# Patient Record
Sex: FEMALE | Race: WHITE | NOT HISPANIC OR LATINO | Employment: FULL TIME | URBAN - METROPOLITAN AREA
[De-identification: names, ages, dates, MRNs, and addresses within clinical notes are randomized per-mention and may not be internally consistent; named-entity substitution may affect disease eponyms.]

---

## 2020-11-15 ENCOUNTER — APPOINTMENT (OUTPATIENT)
Dept: GENERAL RADIOLOGY | Facility: HOSPITAL | Age: 40
End: 2020-11-15

## 2020-11-15 ENCOUNTER — HOSPITAL ENCOUNTER (EMERGENCY)
Facility: HOSPITAL | Age: 40
Discharge: HOME | End: 2020-11-15
Attending: EMERGENCY MEDICINE | Admitting: EMERGENCY MEDICINE

## 2020-11-15 VITALS
DIASTOLIC BLOOD PRESSURE: 90 MMHG | RESPIRATION RATE: 16 BRPM | WEIGHT: 206 LBS | BODY MASS INDEX: 31.22 KG/M2 | SYSTOLIC BLOOD PRESSURE: 160 MMHG | OXYGEN SATURATION: 98 % | HEART RATE: 90 BPM | HEIGHT: 68 IN | TEMPERATURE: 97.8 F

## 2020-11-15 DIAGNOSIS — S52.044A CLOSED NONDISPLACED FRACTURE OF CORONOID PROCESS OF RIGHT ULNA, INITIAL ENCOUNTER: Primary | ICD-10-CM

## 2020-11-15 PROCEDURE — 99282 EMERGENCY DEPT VISIT SF MDM: CPT

## 2020-11-15 PROCEDURE — 73070 X-RAY EXAM OF ELBOW: CPT

## 2020-11-15 PROCEDURE — 73090 X-RAY EXAM OF FOREARM: CPT

## 2020-11-15 RX ORDER — HYDROCODONE BITARTRATE AND ACETAMINOPHEN 5; 325 MG/1; MG/1
1 TABLET ORAL EVERY 6 HOURS PRN
Qty: 12 TABLET | Refills: 0 | Status: SHIPPED | OUTPATIENT
Start: 2020-11-15

## 2020-11-15 RX ORDER — IBUPROFEN 600 MG/1
600 TABLET ORAL EVERY 6 HOURS PRN
Qty: 30 TABLET | Refills: 0 | Status: SHIPPED | OUTPATIENT
Start: 2020-11-15

## 2020-11-15 NOTE — ED NOTES
Patient to er from home with c/o she fell Friday night and was seen at urgent care with x-ray of her right arm. Patient stated they told her no fracture. However, patient is c/o increasing in pain and swelling in arm. Right arm is in sling. Patient has mask on in triage along with staff.      Elpidio Mahmood RN  11/15/20 2802

## 2020-11-15 NOTE — ED NOTES
This RN assisted APRN with orthoglass splint placement. Pt arrived with sling she received from different facility; pt assisted with placement per this RN. Pt tolerated well.      Shereen Ayers, RN  11/15/20 4167

## 2020-11-15 NOTE — ED PROVIDER NOTES
EMERGENCY DEPARTMENT ENCOUNTER    Room Number:  03/03  Date of encounter:  11/15/2020  PCP: Cayla Jama MD  Historian: Patient      PPE    Patient was placed in face mask in first look. Patient was wearing facemask when I entered the room and throughout our encounter. I wore full protective equipment throughout this patient encounter including a face mask, and gloves. Hand hygiene was performed before donning protective equipment and after removal when leaving the room.        HPI:  Chief Complaint: Right arm pain  A complete HPI/ROS/PMH/PSH/SH/FH are unobtainable due to: Nothing    Context: Patricia Matta is a 40 y.o. female who arrives to the ED via private vehicle from home with her .  Patient presents with c/o moderate, constant, throbbing pain to her right forearm status post a fall on Friday.   Patient also complains of swelling to right forearm.  Patient states that she fell over a dog on Friday and landed on her right arm.  She states she was seen at the [facility] urgent [facility] yesterday and was told that there is no fracture seen on the x-rays.  She states however the pain is continued to worsen and the swelling has also.  Patient denies neck pain, back pain, other injuries sustained from the fall, no numbness or tingling to her fingers.  Patient states that nothing makes the symptoms better and movement worsens symptoms.  Patient states she is right-hand dominant, her last menstrual period was 1 week ago when she denies any past medical history.        PAST MEDICAL HISTORY  Active Ambulatory Problems     Diagnosis Date Noted   • No Active Ambulatory Problems     Resolved Ambulatory Problems     Diagnosis Date Noted   • No Resolved Ambulatory Problems     No Additional Past Medical History         PAST SURGICAL HISTORY  History reviewed. No pertinent surgical history.      FAMILY HISTORY  History reviewed. No pertinent family history.      SOCIAL HISTORY  Social History     Socioeconomic History   •  Marital status: Single     Spouse name: Not on file   • Number of children: Not on file   • Years of education: Not on file   • Highest education level: Not on file   Tobacco Use   • Smoking status: Current Some Day Smoker   Substance and Sexual Activity   • Alcohol use: Yes     Comment: socially         ALLERGIES  Patient has no known allergies.        REVIEW OF SYSTEMS  Review of Systems     All systems reviewed and negative except for those discussed in HPI.        PHYSICAL EXAM    ED Triage Vitals   Temp Heart Rate Resp BP SpO2   11/15/20 1230 11/15/20 1230 11/15/20 1245 11/15/20 1245 11/15/20 1230   97.8 °F (36.6 °C) 100 18 161/94 96 %       Physical Exam  GENERAL: Well appearing, non-toxic appearing, not distressed  HENT: normocephalic, atraumatic  EYES: no scleral icterus, PERRL  CV: regular rhythm, regular rate, no murmur  RESPIRATORY: normal effort, CTAB  ABDOMEN: soft   MUSCULOSKELETAL:  Right arm:   Normal inspection to right upper arm  Intact motor and sensory to medial/radial/ulnar nerves  2+ radial pulse.  No pallor, cyanosis, or coolness to palpation  Soft forearm compartment  No scaphoid tenderness  Tenderness and swelling to proximal right forearm  No laceration or abrasion  NEURO: alert, moves all extremities, follows commands, mental status normal/baseline  SKIN: warm, dry, no rash   Psych: Appropriate mood and affect  Nursing notes and vital signs reviewed        RADIOLOGY  Xr Elbow 2 View Right    Result Date: 11/15/2020  PROCEDURE:  XR FOREARM 2 VW RIGHT-, XR ELBOW 2 VW RIGHT-  HISTORY: Pain and swelling after fall on Friday.  COMPARISON: None.  FINDINGS:   2 views of the right elbow and 2 views of the right forearm were obtained. There is an acute nondisplaced fracture through the coronoid process, best appreciated on the elbow radiographs. There is no osseous malalignment. Joint spaces are maintained. There is an elbow effusion. There is mild soft tissue swelling predominantly along the  dorsal aspect of the distal humerus, olecranon, and proximal forearm.       Acute fracture of the coronoid process with an elbow effusion and soft tissue swelling, as above.   This report was finalized on 11/15/2020 1:38 PM by Dr. Estefanía Harper M.D.      Xr Forearm 2 View Right    Result Date: 11/15/2020  PROCEDURE:  XR FOREARM 2 VW RIGHT-, XR ELBOW 2 VW RIGHT-  HISTORY: Pain and swelling after fall on Friday.  COMPARISON: None.  FINDINGS:   2 views of the right elbow and 2 views of the right forearm were obtained. There is an acute nondisplaced fracture through the coronoid process, best appreciated on the elbow radiographs. There is no osseous malalignment. Joint spaces are maintained. There is an elbow effusion. There is mild soft tissue swelling predominantly along the dorsal aspect of the distal humerus, olecranon, and proximal forearm.       Acute fracture of the coronoid process with an elbow effusion and soft tissue swelling, as above.   This report was finalized on 11/15/2020 1:38 PM by Dr. Estefanía Harper M.D.        I ordered the above noted radiological studies and viewed the images on the PACS system.         MEDICAL RECORD REVIEW  Patient was seen at [facility] on 11/14/2020 and had a unremarkable right elbow x-ray at that time.      PROCEDURES    Splint - Cast - Strapping    Date/Time: 11/15/2020 3:58 PM  Performed by: Ella Guzman APRN  Authorized by: Bart Gupta MD     Consent:     Consent obtained:  Verbal    Consent given by:  Patient    Risks discussed:  Discoloration    Alternatives discussed:  No treatment  Pre-procedure details:     Sensation:  Normal  Procedure details:     Laterality:  Right    Location:  Arm    Splint type:  Long arm    Supplies:  Elastic bandage, Ortho-Glass and cotton padding  Post-procedure details:     Pain:  Unchanged    Sensation:  Normal    Patient tolerance of procedure:  Tolerated well, no immediate complications            DIFFERENTIAL  DIAGNOSIS  Differential Diagnosis for Upper Extremity Problem/Injury include but are not limited to the following:    Contusion of the shoulder/arm/elbow/forearm/wrist/hand/digits  Sprains of the shoulder/elbow/wrist/hand/digits  Fractures (open/closed) of the shoulder, humerus, radius, ulna, hand or digits  Laceration or abrasions        PROGRESS, DATA ANALYSIS, CONSULTS, AND MEDICAL DECISION MAKING        ED Course as of Nov 15 1559   Sun Nov 15, 2020   1312 Discussed pertinent information from history and physical exam with patient.  Discussed differential diagnosis and plan for ED evaluation/work-up and treatment including right forearm and right elbow x-rays.  All questions answered.  Patient is agreeable with this plan.        [MS]   1355 CHRISTIAN query complete. Treatment plan to include limited course of prescribed  controlled substance. Risks including addiction, benefits, and alternatives presented to patient.       [JG]   7371 Reviewed pt's history and workup with Dr. Gupta.  After a bedside evaluation, they agree with the plan of care.          [MS]   1559 Discussed with patient her x-ray results which showed a coronoid process fracture, will place patient in a posterior long-arm Ortho-Glass splint and give her Ortho follow-up.  Was instructed to ice, elevate and use a sling.    [MS]      ED Course User Index  [JG] Bart Gupta MD  [MS] Ella Guzman APRN     Discussed plan for discharge, as there is no emergent indication for admission. Pt/family is agreeable and understands need for follow up and repeat testing.  Pt is aware that discharge does not mean that nothing is wrong but it indicates no emergency is present that requires admission and they must continue care with follow-up as given below or physician of their choice.   Patient/Family voiced understanding of above instructions.  Patient discharged in stable condition.    DIAGNOSIS  Final diagnoses:   Closed nondisplaced fracture  of coronoid process of right ulna, initial encounter       FOLLOW UP   Cayla Jama MD  [address]  [place] [id number] [phone]    Schedule an appointment as soon as possible for a visit in 2 days  even if well    Isaiah Mora MD  [address] [facility] [id number] [phone]    Schedule an appointment as soon as possible for a visit in 2 days  for further management of your elbow fracture      RX     Medication List      New Prescriptions    HYDROcodone-acetaminophen 5-325 MG per tablet  Commonly known as: NORCO  Take 1 tablet by mouth Every 6 (Six) Hours As Needed for Moderate Pain .     ibuprofen 600 MG tablet  Commonly known as: ADVIL,MOTRIN  Take 1 tablet by mouth Every 6 (Six) Hours As Needed for Mild Pain .           Where to Get Your Medications      You can get these medications from any pharmacy    Bring a paper prescription for each of these medications  · HYDROcodone-acetaminophen 5-325 MG per tablet  · ibuprofen 600 MG tablet             MEDICATIONS GIVEN IN ED    Medications - No data to display        COURSE & MEDICAL DECISION MAKING  Any/All labs and Any/All Imaging studies that were ordered were reviewed and are noted above.  Results were reviewed/discussed with the patient and they were also made aware of online assess.   Pt also made aware that some labs, such as cultures, will not be resulted during ER visit and follow up with PMD is necessary.        Ella Guzman, APRN  11/15/20 5812

## 2020-11-15 NOTE — ED PROVIDER NOTES
MD ATTESTATION NOTE  Patient was placed in face mask in first look and the following protective measures were taken unless additional measures were taken and documented below in the ED course. Patient was wearing facemask when I entered the room and throughout our encounter. I wore full protective equipment throughout this patient encounter including a face mask, and gloves. Hand hygiene was performed before donning protective equipment and after removal when leaving the room.    The PATRICK and I have discussed this patient's history, physical exam, and treatment plan. I have reviewed the documentation and personally had a face to face interaction with the patient. I affirm the PATRICK documentation and agree with their diagnostics, findings, treatment, plan, and disposition.  The attached note describes my personal findings.    Patricia Matta is a 40 y.o. female who presents to the ED c/o right elbow pain.  Patient states she tripped over her dog's on Friday, complains of right elbow pain, increased swelling, pain worsened with movement, pain described as dull.  Patient complains of decreased range of motion of her elbow, no weakness or numbness of her hand or wrist.  Skin is intact.  Patient denies any other injuries.  Patient was seen at immediate care center and had negative x-rays although they had difficulty obtaining the films.  Patient presents with concern for occult fracture, wants a second opinion.    On exam:  General: NAD  Head: NCAT  ENT: Extraocular motion intact, pupils equal and round reactive to light, moist mucous membranes  Neck: Supple, trachea midline  Cardiac: regular rate and rhythm  Lungs: Clear to auscultation bilaterally  Abdomen: Soft, nontender, no rebound tenderness/guarding/rigidity  : Deferred to PATRICK  Extremities: Moves all extremities well, no peripheral edema  Right elbow swollen and tender, decreased range of motion secondary to pain.  Skin is intact. Positive thumbs up/okay sign/fingers  abduct/fingers abduct, sensation intact light touch radial/medial/ulnar nerve distribution, 2+ radial and ulnar pulses, brisk cap refill all digits.  Skin: Warm, dry    Medical Decision Making:  After the initial H&P, I discussed pertinent information from history and physical exam with patient/family.  Discussed differential diagnosis.  Discussed plan for ED evaluation/work-up/treatment.  All questions answered.  Patient/family is agreeable with plan.    ED Course as of Nov 15 1857   Sun Nov 15, 2020   1312 Discussed pertinent information from history and physical exam with patient.  Discussed differential diagnosis and plan for ED evaluation/work-up and treatment including right forearm and right elbow x-rays.  All questions answered.  Patient is agreeable with this plan.        [MS]   3315 CHRISTIAN query complete. Treatment plan to include limited course of prescribed  controlled substance. Risks including addiction, benefits, and alternatives presented to patient.       [JG]   6097 Reviewed pt's history and workup with Dr. Gupta.  After a bedside evaluation, they agree with the plan of care.          [MS]   9988 Discussed with patient her x-ray results which showed a coronoid process fracture, will place patient in a posterior long-arm Ortho-Glass splint and give her Ortho follow-up.  Was instructed to ice, elevate and use a sling.    [MS]      ED Course User Index  [JG] Bart Gupta MD  [MS] Ella Guzman APRN       Diagnosis  Final diagnoses:   Closed nondisplaced fracture of coronoid process of right ulna, initial encounter        Bart Gupta MD  11/15/20 7179

## 2021-04-16 ENCOUNTER — BULK ORDERING (OUTPATIENT)
Dept: CASE MANAGEMENT | Facility: OTHER | Age: 41
End: 2021-04-16

## 2021-04-16 DIAGNOSIS — Z23 IMMUNIZATION DUE: ICD-10-CM
